# Patient Record
Sex: FEMALE | Race: BLACK OR AFRICAN AMERICAN | NOT HISPANIC OR LATINO
[De-identification: names, ages, dates, MRNs, and addresses within clinical notes are randomized per-mention and may not be internally consistent; named-entity substitution may affect disease eponyms.]

---

## 2017-01-26 DIAGNOSIS — E55.9 VITAMIN D DEFICIENCY, UNSPECIFIED: ICD-10-CM

## 2017-01-27 PROBLEM — E55.9 VITAMIN D DEFICIENCY: Status: ACTIVE | Noted: 2017-01-27

## 2017-01-27 RX ORDER — ERGOCALCIFEROL 1.25 MG/1
1.25 MG CAPSULE, LIQUID FILLED ORAL
Qty: 12 | Refills: 0 | Status: ACTIVE | COMMUNITY
Start: 2017-01-27 | End: 1900-01-01

## 2017-02-17 ENCOUNTER — APPOINTMENT (OUTPATIENT)
Dept: FAMILY MEDICINE | Facility: CLINIC | Age: 52
End: 2017-02-17

## 2017-02-28 ENCOUNTER — APPOINTMENT (OUTPATIENT)
Dept: MAMMOGRAPHY | Facility: HOSPITAL | Age: 52
End: 2017-02-28

## 2017-02-28 ENCOUNTER — OUTPATIENT (OUTPATIENT)
Dept: OUTPATIENT SERVICES | Facility: HOSPITAL | Age: 52
LOS: 1 days | End: 2017-02-28
Payer: MEDICAID

## 2017-02-28 ENCOUNTER — APPOINTMENT (OUTPATIENT)
Dept: ULTRASOUND IMAGING | Facility: HOSPITAL | Age: 52
End: 2017-02-28

## 2017-03-20 ENCOUNTER — APPOINTMENT (OUTPATIENT)
Dept: ULTRASOUND IMAGING | Facility: HOSPITAL | Age: 52
End: 2017-03-20

## 2017-03-20 ENCOUNTER — OUTPATIENT (OUTPATIENT)
Dept: OUTPATIENT SERVICES | Facility: HOSPITAL | Age: 52
LOS: 1 days | End: 2017-03-20
Payer: MEDICAID

## 2017-03-20 ENCOUNTER — APPOINTMENT (OUTPATIENT)
Dept: MAMMOGRAPHY | Facility: HOSPITAL | Age: 52
End: 2017-03-20

## 2017-03-20 PROCEDURE — G0279: CPT

## 2017-03-20 PROCEDURE — 76642 ULTRASOUND BREAST LIMITED: CPT

## 2017-03-20 PROCEDURE — 77065 DX MAMMO INCL CAD UNI: CPT

## 2017-04-13 PROCEDURE — 77063 BREAST TOMOSYNTHESIS BI: CPT

## 2017-04-13 PROCEDURE — 76856 US EXAM PELVIC COMPLETE: CPT

## 2017-04-13 PROCEDURE — 77067 SCR MAMMO BI INCL CAD: CPT

## 2017-04-13 PROCEDURE — 76830 TRANSVAGINAL US NON-OB: CPT

## 2017-06-20 ENCOUNTER — EMERGENCY (EMERGENCY)
Facility: HOSPITAL | Age: 52
LOS: 1 days | Discharge: HOME | End: 2017-06-20
Admitting: INTERNAL MEDICINE
Payer: MEDICAID

## 2017-06-20 PROCEDURE — 73610 X-RAY EXAM OF ANKLE: CPT

## 2017-06-20 PROCEDURE — 29515 APPLICATION SHORT LEG SPLINT: CPT | Mod: RT

## 2017-06-20 PROCEDURE — 29515 APPLICATION SHORT LEG SPLINT: CPT

## 2017-06-20 PROCEDURE — 99283 EMERGENCY DEPT VISIT LOW MDM: CPT | Mod: 25

## 2017-06-20 PROCEDURE — 73610 X-RAY EXAM OF ANKLE: CPT | Mod: 26,RT

## 2017-06-28 ENCOUNTER — APPOINTMENT (OUTPATIENT)
Dept: FAMILY MEDICINE | Facility: CLINIC | Age: 52
End: 2017-06-28

## 2017-07-23 ENCOUNTER — EMERGENCY (EMERGENCY)
Facility: HOSPITAL | Age: 52
LOS: 1 days | Discharge: HOME | End: 2017-07-23
Admitting: INTERNAL MEDICINE
Payer: MEDICAID

## 2017-07-23 PROCEDURE — 94640 AIRWAY INHALATION TREATMENT: CPT

## 2017-07-23 PROCEDURE — 99284 EMERGENCY DEPT VISIT MOD MDM: CPT

## 2017-07-23 PROCEDURE — 99284 EMERGENCY DEPT VISIT MOD MDM: CPT | Mod: 25

## 2017-08-25 ENCOUNTER — EMERGENCY (EMERGENCY)
Facility: HOSPITAL | Age: 52
LOS: 1 days | Discharge: HOME | End: 2017-08-25
Admitting: EMERGENCY MEDICINE
Payer: MEDICAID

## 2017-08-25 PROCEDURE — 99284 EMERGENCY DEPT VISIT MOD MDM: CPT

## 2017-08-25 PROCEDURE — 99283 EMERGENCY DEPT VISIT LOW MDM: CPT | Mod: 25

## 2017-08-25 PROCEDURE — 96372 THER/PROPH/DIAG INJ SC/IM: CPT

## 2017-09-06 ENCOUNTER — APPOINTMENT (OUTPATIENT)
Dept: FAMILY MEDICINE | Facility: CLINIC | Age: 52
End: 2017-09-06

## 2023-02-18 ENCOUNTER — EMERGENCY (EMERGENCY)
Facility: HOSPITAL | Age: 58
LOS: 1 days | Discharge: HOME | End: 2023-02-18
Attending: EMERGENCY MEDICINE
Payer: COMMERCIAL

## 2023-02-18 VITALS
WEIGHT: 188.94 LBS | DIASTOLIC BLOOD PRESSURE: 77 MMHG | SYSTOLIC BLOOD PRESSURE: 155 MMHG | HEIGHT: 67 IN | HEART RATE: 66 BPM | OXYGEN SATURATION: 97 % | TEMPERATURE: 98 F | RESPIRATION RATE: 20 BRPM

## 2023-02-18 PROCEDURE — 99283 EMERGENCY DEPT VISIT LOW MDM: CPT

## 2023-02-18 PROCEDURE — 99284 EMERGENCY DEPT VISIT MOD MDM: CPT

## 2023-02-18 RX ORDER — ALBUTEROL 90 UG/1
0 AEROSOL, METERED ORAL
Qty: 0 | Refills: 0 | DISCHARGE

## 2023-02-18 RX ORDER — VALSARTAN 80 MG/1
0 TABLET ORAL
Qty: 0 | Refills: 0 | DISCHARGE

## 2023-02-18 RX ORDER — LIDOCAINE 4 G/100G
1 CREAM TOPICAL ONCE
Refills: 0 | Status: DISCONTINUED | OUTPATIENT
Start: 2023-02-18 | End: 2023-02-22

## 2023-02-18 RX ORDER — METFORMIN HYDROCHLORIDE 850 MG/1
0 TABLET ORAL
Qty: 0 | Refills: 0 | DISCHARGE

## 2023-02-18 RX ORDER — IBUPROFEN 200 MG
600 TABLET ORAL ONCE
Refills: 0 | Status: COMPLETED | OUTPATIENT
Start: 2023-02-18 | End: 2023-02-18

## 2023-02-18 RX ORDER — LIDOCAINE 4 G/100G
1 CREAM TOPICAL ONCE
Refills: 0 | Status: COMPLETED | OUTPATIENT
Start: 2023-02-18 | End: 2023-02-18

## 2023-02-18 RX ORDER — ACETAMINOPHEN 500 MG
975 TABLET ORAL ONCE
Refills: 0 | Status: COMPLETED | OUTPATIENT
Start: 2023-02-18 | End: 2023-02-18

## 2023-02-18 RX ADMIN — LIDOCAINE 1 PATCH: 4 CREAM TOPICAL at 14:38

## 2023-02-18 RX ADMIN — Medication 600 MILLIGRAM(S): at 14:39

## 2023-02-18 RX ADMIN — Medication 975 MILLIGRAM(S): at 14:39

## 2023-02-18 NOTE — ED PROVIDER NOTE - PATIENT PORTAL LINK FT
You can access the FollowMyHealth Patient Portal offered by [facility] by registering at the following website: [email or web address]. By joining [employer]’s FollowMyHealth portal, you will also be able to view your health information using other applications (apps) compatible with our system.

## 2023-02-18 NOTE — ED PROVIDER NOTE - NSFOLLOWUPINSTRUCTIONS_ED_ALL_ED_FT
1. Return to ED for worsening, progressive or any other concerning symptoms   2. Follow up with your primary care doctor in 2-3days  3. motrin 600 mg for pain

## 2023-02-18 NOTE — ED ADULT NURSE NOTE - OBJECTIVE STATEMENT
pt 57yf aox4 bib ems/[facility], [facility]  at work/airport states was in a disabled bathroom and the whole door was off its hinges and fell on her left side, denies loc, pt c/o inc pain to left side of her body, pt in no acute distress pending eval

## 2023-02-18 NOTE — ED PROVIDER NOTE - CLINICAL SUMMARY MEDICAL DECISION MAKING FREE TEXT BOX
Alexis Burnham DO PGY-2: 57-year-old female past medical history of hypertension presents to ED complaining of left lateral neck left-sided back pain associated with left hip and knee pain after opening bathroom stall door and the door almost falling on top of the patient.  Patient did not fall but quickly moved to left the door and stop it from falling on her and immediately felt pain.  Patient was at the airport waiting a flight to [place] to go home. Denies falls, numbness or tingling, weakness, chest pain, shortness of breath, LOC, abdominal pain, nausea, vomiting, dysuria, diarrhea.  Patient was ambulating after this incident. Patient with tenderness over the left lateral neck and left-sided back over musculature as well as left lateral hip and knee pain.  Likely muscle strain low suspicion for fracture.  Plan for symptomatic control and reassessment.

## 2023-02-18 NOTE — ED PROVIDER NOTE - OBJECTIVE STATEMENT
57-year-old female past medical history of hypertension presents to ED complaining of left lateral neck left-sided back pain associated with left hip and knee pain after opening bathroom stall door and the door almost falling on top of the patient.  Patient did not fall but quickly moved to left the door and stop it from falling on her and immediately felt pain.  Patient was at the airport waiting a flight to [place] to go home. Denies falls, numbness or tingling, weakness, chest pain, shortness of breath, LOC, abdominal pain, nausea, vomiting, dysuria, diarrhea.  Patient was ambulating after this incident.

## 2023-02-18 NOTE — ED PROVIDER NOTE - PHYSICAL EXAMINATION
GEN: Patient awake alert NAD.   HEENT: normocephalic, atraumatic, moist MM  CARDIAC: RRR, S1, S2, no murmur.   PULM: CTA B/L no wheeze, rhonchi, rales.   ABD: soft NT, ND, no rebound no guarding,   MSK: Moving all extremities, no edema. 5/5 strength and full ROM in all extremities. Left lateral neck tenderness, left thoracic and lumbar paraspinal tenderness, left lateral hip and left lateral knee tenderness.  No left thigh tenderness    NEURO: A&Ox3, gait normal, no focal neurological deficits, no CTL midline spinal ttp  SKIN: warm, dry, no rash.

## 2023-02-18 NOTE — ED PROVIDER NOTE - ATTENDING CONTRIBUTION TO CARE
I performed a history and physical exam of the patient and discussed their management with the resident. I reviewed the resident's note and agree with the documented findings and plan of care.  Shari Telles MD

## 2023-02-19 ENCOUNTER — EMERGENCY (EMERGENCY)
Facility: HOSPITAL | Age: 58
LOS: 1 days | Discharge: HOME | End: 2023-02-19
Attending: EMERGENCY MEDICINE | Admitting: EMERGENCY MEDICINE
Payer: COMMERCIAL

## 2023-02-19 VITALS
RESPIRATION RATE: 18 BRPM | HEART RATE: 85 BPM | DIASTOLIC BLOOD PRESSURE: 88 MMHG | HEIGHT: 67 IN | SYSTOLIC BLOOD PRESSURE: 145 MMHG | TEMPERATURE: 98 F | OXYGEN SATURATION: 100 % | WEIGHT: 188.94 LBS

## 2023-02-19 PROBLEM — E11.9 TYPE 2 DIABETES MELLITUS WITHOUT COMPLICATIONS: Chronic | Status: ACTIVE | Noted: 2023-02-18

## 2023-02-19 PROBLEM — Z86.79 PERSONAL HISTORY OF OTHER DISEASES OF THE CIRCULATORY SYSTEM: Chronic | Status: ACTIVE | Noted: 2023-02-18

## 2023-02-19 PROBLEM — J45.909 UNSPECIFIED ASTHMA, UNCOMPLICATED: Chronic | Status: ACTIVE | Noted: 2023-02-18

## 2023-02-19 PROCEDURE — 73110 X-RAY EXAM OF WRIST: CPT | Mod: 26,LT

## 2023-02-19 PROCEDURE — 72100 X-RAY EXAM L-S SPINE 2/3 VWS: CPT

## 2023-02-19 PROCEDURE — 73110 X-RAY EXAM OF WRIST: CPT

## 2023-02-19 PROCEDURE — 73564 X-RAY EXAM KNEE 4 OR MORE: CPT

## 2023-02-19 PROCEDURE — 73564 X-RAY EXAM KNEE 4 OR MORE: CPT | Mod: 26,LT

## 2023-02-19 PROCEDURE — 72100 X-RAY EXAM L-S SPINE 2/3 VWS: CPT | Mod: 26

## 2023-02-19 PROCEDURE — 99284 EMERGENCY DEPT VISIT MOD MDM: CPT

## 2023-02-19 PROCEDURE — 96372 THER/PROPH/DIAG INJ SC/IM: CPT

## 2023-02-19 RX ORDER — DIAZEPAM 5 MG
1 TABLET ORAL
Qty: 9 | Refills: 0
Start: 2023-02-19 | End: 2023-02-21

## 2023-02-19 RX ORDER — KETOROLAC TROMETHAMINE 30 MG/ML
30 SYRINGE (ML) INJECTION ONCE
Refills: 0 | Status: DISCONTINUED | OUTPATIENT
Start: 2023-02-19 | End: 2023-02-19

## 2023-02-19 RX ORDER — IBUPROFEN 200 MG
1 TABLET ORAL
Qty: 20 | Refills: 0
Start: 2023-02-19 | End: 2023-02-23

## 2023-02-19 RX ORDER — DIAZEPAM 5 MG
5 TABLET ORAL ONCE
Refills: 0 | Status: DISCONTINUED | OUTPATIENT
Start: 2023-02-19 | End: 2023-02-19

## 2023-02-19 RX ADMIN — Medication 30 MILLIGRAM(S): at 14:37

## 2023-02-19 RX ADMIN — Medication 30 MILLIGRAM(S): at 14:07

## 2023-02-19 RX ADMIN — Medication 5 MILLIGRAM(S): at 14:07

## 2023-02-19 NOTE — ED PROVIDER NOTE - OBJECTIVE STATEMENT
57-year-old female past medical history of hypertension presents to ED complaining of left lateral neck,  left-sided lower back pain, left knee pain, left wrist pain after opening bathroom stall door and the door almost falling on top of her while she was at the airport.  no fall or head strike. Denies numbness or tingling, weakness, chest pain, shortness of breath, LOC, abdominal pain, nausea, vomiting, dysuria, diarrhea.  Patient was ambulating after this incident. Was seen in the ED yesterday and would like imaging. was given tylenol and motrin with some relief.

## 2023-02-19 NOTE — ED PROVIDER NOTE - NSFOLLOWUPINSTRUCTIONS_ED_ALL_ED_FT
Contusion    A contusion is a deep bruise. Contusions are the result of a blunt injury to tissues and muscle fibers under the skin. The skin overlying the contusion may turn blue, purple, or yellow. Symptoms also include pain and swelling in the injured area.    SEEK IMMEDIATE MEDICAL CARE IF YOU HAVE ANY OF THE FOLLOWING SYMPTOMS: severe pain, numbness, tingling, pain, weakness, or skin color/temperature change in any part of your body distal to the injury.    Back Pain    Back pain is very common in adults. The cause of back pain is rarely dangerous and the pain often gets better over time. The cause of your back pain may not be known and may include strain of muscles or ligaments, degeneration of the spinal disks, or arthritis. Occasionally the pain may radiate down your leg(s). Over-the-counter medicines to reduce pain and inflammation are often the most helpful. Stretching and remaining active frequently helps the healing process.     SEEK IMMEDIATE MEDICAL CARE IF YOU HAVE ANY OF THE FOLLOWING SYMPTOMS: bowel or bladder control problems, unusual weakness or numbness in your arms or legs, nausea or vomiting, abdominal pain, fever, dizziness/lightheadedness.           Follow with your primary care provider within 48-72 hours.  Rest, no heavy lifting.  Warm compresses to area.  Light walking. Take Motrin 600 mg every 6 hours for pain with food, Valium 5mg every 8 hours as needed for muscle spasm- caution drowsiness/do not drive. Any worsening pain, weakness, numbness, bowel or urinary incontinence return to ER

## 2023-02-19 NOTE — ED PROVIDER NOTE - ATTENDING APP SHARED VISIT CONTRIBUTION OF CARE
Omar with RILEY Hernandez. 57-year-old female past medical history of hypertension presents to ED complaining of left lateral neck,  left-sided lower back pain, left knee pain, left wrist pain after opening bathroom stall door and the door almost falling on top of her while she was at the airport.  no fall or head strike. Denies numbness or tingling, weakness, chest pain, shortness of breath, LOC, abdominal pain, nausea, vomiting, dysuria, diarrhea.  Patient was ambulating after this incident. Was seen in the ED yesterday and would like imaging. was given tylenol and motrin with some relief.  xrays, meds, reassess.  Improved with meds. will send to pharmacy  Discussed with patient need to return to ED if symptoms don't continue to improve or recur or develops any new or worsening symptoms that are of concern.    I performed a face to face bedside interview with patient regarding history of present illness, review of symptoms and past medical history. I completed an independent physical exam.  I have discussed the patient's plan of care with Physician Assistant (PA). I agree with note as stated above, having amended the EMR as needed to reflect my findings.   This includes History of Present Illness, HIV, Past Medical/Surgical/Family/Social History, Allergies and Home Medications, Review of Systems, Physical Exam, and any Progress Notes during the time I functioned as the attending physician for this patient.

## 2023-02-19 NOTE — ED PROVIDER NOTE - PHYSICAL EXAMINATION
General:     NAD, well-nourished, well-appearing  Eyes: PERRL  Head:     NC/AT, EOMI, oral mucosa moist  Neck:     FROM. no swelling  Lungs:     CTA b/l  CVS:     RRR  Abd:     +BS, s/nt/nd  Ext:   FROM all extremities, no swelling or deformity, tenderness left knee and left wrist diffusely  Back: No visible deformities or swelling, neck supple non tender, no midline tenderness, (+) left lumbar  and cervical paraspinal tenderness, ROM intact, sensation intact, dp/pt pulses 2+, lower and upper extremity strength 5/5 BEKAH, gait normal   Neuro: AAOx3, no sensory/motor deficits

## 2023-02-19 NOTE — ED ADULT TRIAGE NOTE - CHIEF COMPLAINT QUOTE
Pt had a bathroom stall [place] airport [place] . Pt c/o left side neck, knee, and back pain

## 2023-02-19 NOTE — ED PROVIDER NOTE - PATIENT PORTAL LINK FT
You can access the FollowMyHealth Patient Portal offered by [facility] by registering at the following website: [email or web address]. By joining [employer]’s FollowMyHealth portal, you will also be able to view your health information using other applications (apps) compatible with our system.

## 2023-02-19 NOTE — ED PROVIDER NOTE - CLINICAL SUMMARY MEDICAL DECISION MAKING FREE TEXT BOX
57-year-old female past medical history of hypertension presents to ED complaining of left lateral neck,  left-sided lower back pain, left knee pain, left wrist pain after opening bathroom stall door and the door almost falling on top of her while she was at the airport.  no fall or head strike. Denies numbness or tingling, weakness, chest pain, shortness of breath, LOC, abdominal pain, nausea, vomiting, dysuria, diarrhea.  Patient was ambulating after this incident. Was seen in the ED yesterday and would like imaging. was given tylenol and motrin with some relief.  xrays, meds, reassess. 57-year-old female past medical history of hypertension presents to ED complaining of left lateral neck,  left-sided lower back pain, left knee pain, left wrist pain after opening bathroom stall door and the door almost falling on top of her while she was at the airport.  no fall or head strike. Denies numbness or tingling, weakness, chest pain, shortness of breath, LOC, abdominal pain, nausea, vomiting, dysuria, diarrhea.  Patient was ambulating after this incident. Was seen in the ED yesterday and would like imaging. was given tylenol and motrin with some relief.  xrays, meds, reassess.  clinically muscular. improved with meds. will send to pharmacy  Discussed with patient need to return to ED if symptoms don't continue to improve or recur or develops any new or worsening symptoms that are of concern.

## 2023-02-19 NOTE — ED ADULT NURSE NOTE - OBJECTIVE STATEMENT
Pt presents to ED from home for neck pain. Pt states that she was at the airport yesterday and the bathroom door fell off and almost fell on top of her. She reports now feeling pain of the lower back, left knee, and left wrist. No obvious deformity. Denies head trauma or LOC.

## 2023-03-09 ENCOUNTER — APPOINTMENT (OUTPATIENT)
Dept: FAMILY MEDICINE | Facility: CLINIC | Age: 58
End: 2023-03-09

## 2023-08-18 ENCOUNTER — DASHBOARD ENCOUNTERS (OUTPATIENT)
Age: 58
End: 2023-08-18

## 2023-08-23 ENCOUNTER — OFFICE VISIT (OUTPATIENT)
Dept: URBAN - METROPOLITAN AREA CLINIC 40 | Facility: CLINIC | Age: 58
End: 2023-08-23

## 2023-08-23 RX ORDER — VALSARTAN AND HYDROCHLOROTHIAZIDE 320; 25 MG/1; MG/1
1 TABLET TABLET, FILM COATED ORAL ONCE A DAY
Status: ACTIVE | COMMUNITY

## 2023-08-23 RX ORDER — PANTOPRAZOLE SODIUM 40 MG/1
1 TABLET TABLET, DELAYED RELEASE ORAL ONCE A DAY
Status: ACTIVE | COMMUNITY

## 2023-08-23 RX ORDER — METFORMIN HYDROCHLORIDE 500 MG/1
1 TABLET WITH A MEAL TABLET, FILM COATED ORAL ONCE A DAY
Status: ACTIVE | COMMUNITY